# Patient Record
Sex: MALE | Race: WHITE | Employment: UNEMPLOYED | URBAN - METROPOLITAN AREA
[De-identification: names, ages, dates, MRNs, and addresses within clinical notes are randomized per-mention and may not be internally consistent; named-entity substitution may affect disease eponyms.]

---

## 2017-01-04 ENCOUNTER — OFFICE VISIT (OUTPATIENT)
Dept: FAMILY MEDICINE CLINIC | Facility: CLINIC | Age: 27
End: 2017-01-04

## 2017-01-04 ENCOUNTER — OFFICE VISIT (OUTPATIENT)
Dept: NEUROLOGY | Facility: CLINIC | Age: 27
End: 2017-01-04

## 2017-01-04 VITALS
BODY MASS INDEX: 33.37 KG/M2 | RESPIRATION RATE: 16 BRPM | DIASTOLIC BLOOD PRESSURE: 80 MMHG | HEIGHT: 74 IN | HEART RATE: 90 BPM | SYSTOLIC BLOOD PRESSURE: 136 MMHG | WEIGHT: 260 LBS

## 2017-01-04 DIAGNOSIS — R29.818 TRANSIENT NEUROLOGICAL SYMPTOMS: Primary | ICD-10-CM

## 2017-01-04 DIAGNOSIS — F41.9 ANXIETY: ICD-10-CM

## 2017-01-04 DIAGNOSIS — R20.2 PARESTHESIAS: Primary | ICD-10-CM

## 2017-01-04 PROCEDURE — 99214 OFFICE O/P EST MOD 30 MIN: CPT | Performed by: FAMILY MEDICINE

## 2017-01-04 RX ORDER — DULOXETIN HYDROCHLORIDE 30 MG/1
30 CAPSULE, DELAYED RELEASE ORAL DAILY
Qty: 30 CAPSULE | Refills: 2 | Status: SHIPPED | OUTPATIENT
Start: 2017-01-04 | End: 2017-01-16

## 2017-01-04 RX ORDER — GABAPENTIN 300 MG/1
300 CAPSULE ORAL 3 TIMES DAILY
Qty: 90 CAPSULE | Refills: 1 | Status: SHIPPED | OUTPATIENT
Start: 2017-01-04 | End: 2017-01-16 | Stop reason: ALTCHOICE

## 2017-01-04 NOTE — PROGRESS NOTES
Yoon Leavitt is a 32year old male here for Patient presents with: Follow - Up: The patient would liike to discuss Venlafaxine 75 Mg.  The patient states that he has been experiencing loss of appetite with the mediaction, however he is craving a lot of s anxiety  -uncontrolled  -increase gabapentin to 300mg bid for 5 days, then 300mg tid  -stop effexor  -start cymbalta  -risks and side effects of med discussed, patient expressed understanding  -these meds should hopefully act to reduce nerve pain and anxie

## 2017-01-04 NOTE — PATIENT INSTRUCTIONS
-- start gabapentin 300mg 2x/day, then increase to 3x/day as tolerated  -- start cymbalta as prescribed  -- if not covered, will try to get approved  -- if needed, can start venlafaxine again for time being

## 2017-01-05 ENCOUNTER — TELEPHONE (OUTPATIENT)
Dept: FAMILY MEDICINE CLINIC | Facility: CLINIC | Age: 27
End: 2017-01-05

## 2017-01-05 NOTE — PROGRESS NOTES
EMG machine malfunction; no charge; opened in error     Gregorio Elliott MD, Neurology  [facility]  Pager [phone]  1/4/2017

## 2017-01-10 ENCOUNTER — OFFICE VISIT (OUTPATIENT)
Dept: NEUROLOGY | Facility: CLINIC | Age: 27
End: 2017-01-10

## 2017-01-10 ENCOUNTER — TELEPHONE (OUTPATIENT)
Dept: FAMILY MEDICINE CLINIC | Facility: CLINIC | Age: 27
End: 2017-01-10

## 2017-01-10 DIAGNOSIS — M62.838 MUSCLE SPASM: ICD-10-CM

## 2017-01-10 DIAGNOSIS — R20.2 PARESTHESIAS: Primary | ICD-10-CM

## 2017-01-10 PROCEDURE — 95910 NRV CNDJ TEST 7-8 STUDIES: CPT | Performed by: OTHER

## 2017-01-10 PROCEDURE — 95886 MUSC TEST DONE W/N TEST COMP: CPT | Performed by: OTHER

## 2017-01-10 NOTE — PROCEDURES
[facility]  [address] [place], [address] [facility]  Ph: [phone]  FAX: [phone]        Full Name: Ila Velazquez Gender: Male  Patient ID: [id number] YOB: 1990      Visit Date: 1/10/2017 11:08  Age: Elbow APB 7.97 6.6 108 6.67 23.1 Elbow - Wrist 26 4.48 58   R Ulnar - ADM      Wrist ADM 2.40 12.8 100 5.63 38.8 Wrist - ADM 7        B. Elbow ADM 6.35 11.5 90.1 5.89 35.5 B. Elbow - Wrist 26 3.96 66   R Peroneal - EDB      Ankle EDB 4.64 4.3 100 5.89 14. This is a normal study. There is no electrophysiologic evidence to suggest an underlying large fiber neuropathy, myopathy, or primary demyelinating neuropathy, as clinically questioned.     ------------------------------  Gen Dolan MD, Neurology  [facility]

## 2017-01-10 NOTE — TELEPHONE ENCOUNTER
Pt called and said due to his behavior while being on Gabapentin he has been fired from his job. He would like to speak to Dr. Lianna Russo regarding some kind of disability.

## 2017-01-11 NOTE — TELEPHONE ENCOUNTER
Please let him know that he can check with his work whether he qualifies for short term disability. I would also be happy to write a note stating that he is currently being managed if that would help him get his job back once better.     Given his hard

## 2017-01-13 NOTE — TELEPHONE ENCOUNTER
Left msg for pt to follow up on previous msg left. If pt still needs assistance or has any further questions to contact our office. Did he get the chance to find out about the STD? Did he want the referral in for a navigator?

## 2017-01-16 NOTE — PROGRESS NOTES
Dulce Harris is a 32year old male here for Patient presents with:  Medication Follow-Up: The patient states that the Duloxetine never worked for him   Pain: The patient states that the pain is getting worse       HPI:       Pain/Anxiety/Depression  -sta past, tearful     ASSESSMENT/PLAN:     Depression/Anxiety/Pain  -suspect etiology of all symptoms to be likely be psychosomatic  -most neurological tests have been normal - EEG with very minimal abnormality that is unlikely to be seizure related - however

## 2017-01-19 ENCOUNTER — TELEPHONE (OUTPATIENT)
Dept: FAMILY MEDICINE CLINIC | Facility: CLINIC | Age: 27
End: 2017-01-19

## 2017-01-19 NOTE — TELEPHONE ENCOUNTER
Pt called and said he would like to speak to Dr. Moise Faye regarding his treatment plan. I told him he will not be getting a call back today b/c Dr. Moise Faye is gone for the day.

## 2017-01-24 ENCOUNTER — TELEPHONE (OUTPATIENT)
Dept: FAMILY MEDICINE CLINIC | Facility: CLINIC | Age: 27
End: 2017-01-24

## 2017-01-24 NOTE — TELEPHONE ENCOUNTER
Called Gomez Neri back on 1/19/17  -he is doing better  -denies suicidal ideation  -does better as long as he stays home and doesn't go out much  -this is what he has been doing to keep things controlled  -doesn't think he needs to go to ER at this point  -a

## 2017-01-24 NOTE — TELEPHONE ENCOUNTER
Pt called and said he would like to speak to Dr. Luis Le regarding how he is doing with the pain and the actions that are going to be taken.

## 2017-01-24 NOTE — TELEPHONE ENCOUNTER
Called patient back  -pain, anxiety not improving  -interested in getting evaluation for potential inpatient admission  -called several outpatient psychiatrists and none take his insurance  -he will go to [facility] ER for psychiatric evaluation - in [place]

## 2017-01-25 ENCOUNTER — TELEPHONE (OUTPATIENT)
Dept: FAMILY MEDICINE CLINIC | Facility: CLINIC | Age: 27
End: 2017-01-25

## 2017-01-25 NOTE — TELEPHONE ENCOUNTER
Spoke to patient and explained one option might be to see when he is eligible to enroll in the [address] as THE [facility] is now also taking that form of Medicaid but to check out their website first to see if possibly psych care is covere

## 2017-01-25 NOTE — TELEPHONE ENCOUNTER
Pt called [facility] and they don't have anybody for Elsa Estrada to see. Every number we gave them they don't take [facility].

## 2017-01-25 NOTE — TELEPHONE ENCOUNTER
Spoke to patient and he said that the ER visit resulted in nothing. They did not have an inpatient psych treatment but wanted him to take or go thru outpatient but the center they gave him does not take his insurance. He was not given any meds.   Explaine

## 2017-01-25 NOTE — TELEPHONE ENCOUNTER
Please tell him that I have sent a message to our coordinators for help with our options  -we should get some answers hopefully in a day or two

## 2017-01-27 ENCOUNTER — TELEPHONE (OUTPATIENT)
Dept: FAMILY MEDICINE CLINIC | Facility: CLINIC | Age: 27
End: 2017-01-27

## 2017-01-27 RX ORDER — LORAZEPAM 0.5 MG/1
0.5 TABLET ORAL 3 TIMES DAILY
Qty: 30 TABLET | Refills: 0 | Status: SHIPPED | OUTPATIENT
Start: 2017-01-27 | End: 2017-03-01

## 2017-01-27 NOTE — TELEPHONE ENCOUNTER
Dicussed case with Dr. Daily Perea  -he recommends trying ativan 0.5mg 3x/day (9a,1p,9p) - can decrease to bid if too sedating - but this is less sedating than clonazepam  -continue prozac 20mg for at least 4 wks  -could consider adding wellbutrin at that time

## 2017-01-30 ENCOUNTER — TELEPHONE (OUTPATIENT)
Dept: FAMILY MEDICINE CLINIC | Facility: CLINIC | Age: 27
End: 2017-01-30

## 2017-01-31 NOTE — TELEPHONE ENCOUNTER
Spoke to patient and he said that the minutes on his phone went out so the call was dropped with the information for the coordinator.   Gave him the number to [employer] to contact and asked pt to leave her a message and explain what happened

## 2017-02-07 ENCOUNTER — TELEPHONE (OUTPATIENT)
Dept: FAMILY MEDICINE CLINIC | Facility: CLINIC | Age: 27
End: 2017-02-07

## 2017-02-09 NOTE — TELEPHONE ENCOUNTER
Pt found a facility that takes [facility] psych patients, [facility]   He will need a referral

## 2017-02-27 ENCOUNTER — OFFICE VISIT (OUTPATIENT)
Dept: FAMILY MEDICINE CLINIC | Facility: CLINIC | Age: 27
End: 2017-02-27

## 2017-02-27 VITALS
HEIGHT: 74 IN | RESPIRATION RATE: 18 BRPM | SYSTOLIC BLOOD PRESSURE: 122 MMHG | HEART RATE: 100 BPM | DIASTOLIC BLOOD PRESSURE: 80 MMHG | BODY MASS INDEX: 34.78 KG/M2 | WEIGHT: 271 LBS

## 2017-02-27 DIAGNOSIS — R20.2 PARESTHESIAS: ICD-10-CM

## 2017-02-27 DIAGNOSIS — R29.818 TRANSIENT NEUROLOGICAL SYMPTOMS: Primary | ICD-10-CM

## 2017-02-27 PROCEDURE — 99213 OFFICE O/P EST LOW 20 MIN: CPT | Performed by: FAMILY MEDICINE

## 2017-02-27 NOTE — PATIENT INSTRUCTIONS
-- call Hines Lefort to get counseling options that take your insurance  -- agree to hold any medication for now  -- continue to maintain relationship with friend to have enjoyable conversations  -- call to schedule pending EEG test   -- try to start an exercise

## 2017-03-01 NOTE — PROGRESS NOTES
Chiquita Nunez is a 32year old male here for Patient presents with:   Follow - Up      HPI:     Nerve Pain  -improved significantly after having a good conversation with a friend about topics he enjoys  -has since stopped all meds - felt the fluoxetine mad

## 2017-03-06 ENCOUNTER — TELEPHONE (OUTPATIENT)
Dept: FAMILY MEDICINE CLINIC | Facility: CLINIC | Age: 27
End: 2017-03-06

## 2017-03-06 NOTE — TELEPHONE ENCOUNTER
Pt called to schedule his EEG (sleep deprived) at the [facility] and the test that was ordered is the test he's already had done in the past so he did not schedule it.

## 2017-03-07 NOTE — TELEPHONE ENCOUNTER
Spoke to patient and he said he went to order this test but that Central scheduling could not find it. Spoke to Radha/Central scheduling and she found it and she will schedule patient for this test.  Pt is to call her directly at [phone].   Number giv

## 2017-03-07 NOTE — TELEPHONE ENCOUNTER
Please have him call them back to clarify, as the test ordered looks like it is a 24 hour EEG which is different that his other test  -if they do not find it in the system, let us know  -thanks

## 2017-03-13 ENCOUNTER — NURSE ONLY (OUTPATIENT)
Dept: ELECTROPHYSIOLOGY | Facility: HOSPITAL | Age: 27
End: 2017-03-13
Attending: Other
Payer: MEDICAID

## 2017-03-13 DIAGNOSIS — R20.2 PARESTHESIA: ICD-10-CM

## 2017-03-13 DIAGNOSIS — R29.818 TRANSIENT NEUROLOGICAL SYMPTOMS: ICD-10-CM

## 2017-03-13 DIAGNOSIS — R94.01 ABNORMAL EEG: ICD-10-CM

## 2017-03-13 DIAGNOSIS — G24.5 EYE TWITCH: ICD-10-CM

## 2017-03-13 PROCEDURE — 95953 EEG MONITORING/COMPUTER: CPT | Performed by: OTHER

## 2017-03-14 ENCOUNTER — NURSE ONLY (OUTPATIENT)
Dept: ELECTROPHYSIOLOGY | Facility: HOSPITAL | Age: 27
End: 2017-03-14
Attending: Other
Payer: MEDICAID

## 2017-03-17 ENCOUNTER — TELEPHONE (OUTPATIENT)
Dept: FAMILY MEDICINE CLINIC | Facility: CLINIC | Age: 27
End: 2017-03-17

## 2017-03-24 ENCOUNTER — TELEPHONE (OUTPATIENT)
Dept: FAMILY MEDICINE CLINIC | Facility: CLINIC | Age: 27
End: 2017-03-24

## 2017-03-27 ENCOUNTER — TELEPHONE (OUTPATIENT)
Dept: FAMILY MEDICINE CLINIC | Facility: CLINIC | Age: 27
End: 2017-03-27

## 2017-03-27 ENCOUNTER — TELEPHONE (OUTPATIENT)
Dept: NEUROLOGY | Facility: CLINIC | Age: 27
End: 2017-03-27

## 2017-03-27 NOTE — TELEPHONE ENCOUNTER
Patient had an initial consult for counseling and they are supposed to get back with him regarding appts.   Patient will f/u with Dr Juliana Pereira as needed

## 2017-03-27 NOTE — TELEPHONE ENCOUNTER
Because the EEG was normal, I think counseling will continue to help minimize his symptoms  -has he started this yet?  -if he has, and is still having symptoms, have him followup with me  -thanks

## 2017-03-31 ENCOUNTER — TELEPHONE (OUTPATIENT)
Dept: FAMILY MEDICINE CLINIC | Facility: CLINIC | Age: 27
End: 2017-03-31

## 2017-03-31 RX ORDER — CITALOPRAM 20 MG/1
20 TABLET ORAL DAILY
Qty: 30 TABLET | Refills: 2 | Status: SHIPPED | OUTPATIENT
Start: 2017-03-31 | End: 2017-05-01

## 2017-03-31 NOTE — TELEPHONE ENCOUNTER
Pains are getting worse  -no longer taking any meds  -has appt with counselor next week  -wants to try something else  -sent in celexa   -counseled on side effects  -f/u in 2-4 wks depending on results

## 2017-05-01 ENCOUNTER — TELEPHONE (OUTPATIENT)
Dept: FAMILY MEDICINE CLINIC | Facility: CLINIC | Age: 27
End: 2017-05-01

## 2017-05-01 RX ORDER — CITALOPRAM 20 MG/1
20 TABLET ORAL DAILY
Qty: 30 TABLET | Refills: 0 | Status: SHIPPED | OUTPATIENT
Start: 2017-05-01 | End: 2017-05-08

## 2017-05-01 NOTE — TELEPHONE ENCOUNTER
rx for citalopram has been sent to pharmacy in [facility]    Dr Phelps,patient is requesting a call from you

## 2017-05-01 NOTE — TELEPHONE ENCOUNTER
Attempted to call patient x 3 - only busy signal  -if patient calls back, please verify his call back number  -thanks

## 2017-05-01 NOTE — TELEPHONE ENCOUNTER
Pt would like Dr. Jorge Frank to call him when he gets a moment regarding his treatment plan. Pt would like to be called back at [phone]. Pt also needs a refill on Citalopram Hydrobromide 20 MG sent to the [facility] in [place], [place].   Pt also had me turpin

## 2017-05-08 ENCOUNTER — OFFICE VISIT (OUTPATIENT)
Dept: FAMILY MEDICINE CLINIC | Facility: CLINIC | Age: 27
End: 2017-05-08

## 2017-05-08 VITALS
DIASTOLIC BLOOD PRESSURE: 88 MMHG | HEART RATE: 69 BPM | SYSTOLIC BLOOD PRESSURE: 128 MMHG | RESPIRATION RATE: 18 BRPM | HEIGHT: 75 IN | TEMPERATURE: 98 F | WEIGHT: 268 LBS | OXYGEN SATURATION: 97 % | BODY MASS INDEX: 33.32 KG/M2

## 2017-05-08 DIAGNOSIS — R20.2 PARESTHESIAS: Primary | ICD-10-CM

## 2017-05-08 DIAGNOSIS — R94.01 ABNORMAL EEG: ICD-10-CM

## 2017-05-08 DIAGNOSIS — M62.838 MUSCLE SPASM: ICD-10-CM

## 2017-05-08 PROCEDURE — 99214 OFFICE O/P EST MOD 30 MIN: CPT | Performed by: FAMILY MEDICINE

## 2017-05-08 RX ORDER — CITALOPRAM 20 MG/1
20 TABLET ORAL DAILY
Qty: 30 TABLET | Refills: 2 | Status: SHIPPED | OUTPATIENT
Start: 2017-05-31 | End: 2017-05-31

## 2017-05-08 NOTE — PROGRESS NOTES
Jacey Coulter is a 32year old male here for Patient presents with:  Paresthesias: F/U and discuss treatment on nerve pain and muscle spasms pt is unable to work       HPI:     Nerve Pain/Paresthesias/Muscle Spasm  -continues to have episodes of pain that Spasms  -referred to [facility] neurology - patient will confirm they take his insurance  -printed test results to bring to appt    Depression  -doing better  -c/w celexa  -c/w counseling  -if symptoms severely worsening go to ER (have not been able to find a ps

## 2017-05-08 NOTE — PATIENT INSTRUCTIONS
-- call [facility] neurology dept to schedule an appt and make sure they take [facility] insurance   -- bring records in during appt  -- continue celexa for now  -- call with any issues

## 2017-05-09 ENCOUNTER — TELEPHONE (OUTPATIENT)
Dept: FAMILY MEDICINE CLINIC | Facility: CLINIC | Age: 27
End: 2017-05-09

## 2017-05-09 NOTE — TELEPHONE ENCOUNTER
I would have him call his insurance to get names of neurologists in his network  - he could at least start by seeing someone in his network for a second opinion

## 2017-05-09 NOTE — TELEPHONE ENCOUNTER
Pt called and said that he talked to the [address] [facility] and they do take [facility] but they only take the Integrated program and not the [facility].

## 2017-05-10 NOTE — TELEPHONE ENCOUNTER
LM for Pricilla Aguilar to contact his insurance to find a neurologist in his network to get a second opinion since the [facility] does not accept his insurance-per Dr Choco Samuel

## 2017-05-10 NOTE — TELEPHONE ENCOUNTER
Merritt Babin -friend of Luis De Guzman said she received a message on her cell for Lan Arora. They are not together. She said it's ok she is his emergency contact for now but call his cell.  [phone]  (changing home number/lk)

## 2017-05-31 RX ORDER — CITALOPRAM 20 MG/1
20 TABLET ORAL DAILY
Qty: 30 TABLET | Refills: 1 | Status: SHIPPED | OUTPATIENT
Start: 2017-05-31 | End: 2017-07-10 | Stop reason: ALTCHOICE

## 2017-05-31 NOTE — TELEPHONE ENCOUNTER
From: Chiquita Nunez  To:  Hay Capellan MD  Sent: 5/31/2017 8:58 AM CDT  Subject: Medication Renewal Request    Original authorizing provider: MD Chiquita Shafer would like a refill of the following medications:  Citalopram Hydrobromid

## 2017-06-07 ENCOUNTER — TELEPHONE (OUTPATIENT)
Dept: FAMILY MEDICINE CLINIC | Facility: CLINIC | Age: 27
End: 2017-06-07

## 2017-06-07 NOTE — TELEPHONE ENCOUNTER
Patient wants to know if this is a diagnosis that can be used to secure social security income  Per Dr Heidi Shaw diagnosis is too vague.  Patient either needs to see Dr Iwona Albert again or get a second opinion from another neurologist. He will also need a

## 2017-06-07 NOTE — TELEPHONE ENCOUNTER
That is exactly what he has - however, all it really means is a nerve problem (neuropathy) which we don't know the cause of (idiopathic)

## 2017-06-07 NOTE — TELEPHONE ENCOUNTER
Pt has been doing some research. He wants to know what Dr. Drake Fong thinks about using diagnosis: Idiopathic Neuropathy?

## 2017-06-26 ENCOUNTER — TELEPHONE (OUTPATIENT)
Dept: FAMILY MEDICINE CLINIC | Facility: CLINIC | Age: 27
End: 2017-06-26

## 2017-06-26 NOTE — TELEPHONE ENCOUNTER
Cymbalta can cause muscle weakness, so this could be an effect of that, and could last up to a couple of weeks    Please clarify if patient was able to see another specialist and who prescribed the cymbalta

## 2017-06-27 NOTE — TELEPHONE ENCOUNTER
Patient was recently hospitalized and Dr Tova Haro put him on Cymbalta 60 mg. It was discontinued by Dr Tova Haro because of the muscle weakness and fatigue.  Dr Tova Haro also discontinued the citalopram. Patient states that he will be seeing Dr Tova Haro again as part of

## 2017-07-10 ENCOUNTER — OFFICE VISIT (OUTPATIENT)
Dept: FAMILY MEDICINE CLINIC | Facility: CLINIC | Age: 27
End: 2017-07-10

## 2017-07-10 ENCOUNTER — TELEPHONE (OUTPATIENT)
Dept: FAMILY MEDICINE CLINIC | Facility: CLINIC | Age: 27
End: 2017-07-10

## 2017-07-10 VITALS
HEART RATE: 82 BPM | WEIGHT: 265 LBS | HEIGHT: 75 IN | SYSTOLIC BLOOD PRESSURE: 138 MMHG | BODY MASS INDEX: 32.95 KG/M2 | DIASTOLIC BLOOD PRESSURE: 70 MMHG

## 2017-07-10 DIAGNOSIS — Z01.818 PREOP EXAMINATION: Primary | ICD-10-CM

## 2017-07-10 DIAGNOSIS — N35.119 POSTINFECTIVE URETHRAL STRICTURE IN MALE: ICD-10-CM

## 2017-07-10 PROCEDURE — 99243 OFF/OP CNSLTJ NEW/EST LOW 30: CPT | Performed by: FAMILY MEDICINE

## 2017-07-10 PROCEDURE — 93000 ELECTROCARDIOGRAM COMPLETE: CPT | Performed by: FAMILY MEDICINE

## 2017-07-10 NOTE — PROGRESS NOTES
PREOPERATIVE HISTORY AND PHYSICAL     Pre-op clearance requested by:  Dr. Galen Nogueira  Proposed surgery: cystoscopy and uretheral dilation  Date of Procedure: 7/21/17  Location: Hospital[facility] (Indications. symptoms): Octavia Alan is a 32year old m Size: large)   Pulse 82   Ht 75\"   Wt 265 lb   BMI 33.12 kg/m²     Gen: NAD, alert and oriented x 3  HEENT: NCAT, PERRL, no exudates  Neck: supple, no LAD, no masses  Pulm: CTAB, no wheezing  CV: RRR, no murmurs  Abd: soft, ND, NT, +BS  Ext: full ROM, nor

## 2017-08-23 DIAGNOSIS — G72.2 TOXIC MYOPATHY: ICD-10-CM

## 2017-08-23 DIAGNOSIS — G93.9 DISEASE OF BRAIN: Primary | ICD-10-CM

## 2017-08-23 NOTE — PROGRESS NOTES
Orders received from [facility] neurology for pt to have labs and MRI done. Per pt he already had the labs done he just needs the MRI. Orders placed in system and will wait for approval from the referral dept.

## 2017-08-31 DIAGNOSIS — R29.818 TRANSIENT NEUROLOGICAL SYMPTOMS: Primary | ICD-10-CM

## 2017-08-31 DIAGNOSIS — R20.2 PARESTHESIAS: ICD-10-CM

## 2017-08-31 DIAGNOSIS — R94.01 ABNORMAL EEG: ICD-10-CM

## 2017-09-20 ENCOUNTER — TELEPHONE (OUTPATIENT)
Dept: FAMILY MEDICINE CLINIC | Facility: CLINIC | Age: 27
End: 2017-09-20

## 2017-09-20 NOTE — TELEPHONE ENCOUNTER
Pt is looking for results from MRI that was done Monday at  [facility]. And also, if we did get them did we we sent them to his Neurologist. Elenita Reyes in [place]?

## 2017-09-20 NOTE — TELEPHONE ENCOUNTER
Spoke to patient and explained that no MRI results have come thru here. Has he called Dr. Roberson Figures office? Pt states he will call them again tomorrow as they are currently closed.

## 2017-09-22 ENCOUNTER — PATIENT MESSAGE (OUTPATIENT)
Dept: FAMILY MEDICINE CLINIC | Facility: CLINIC | Age: 27
End: 2017-09-22

## 2017-09-22 NOTE — TELEPHONE ENCOUNTER
From: Iveth Benitez  To: Ronald Thompson MD  Sent: 9/22/2017 2:15 PM CDT  Subject: Test Results Question    Will Dr Robinson Harmon be reviewing the MRI results or just passing them off to Dr River Resendez?

## 2018-01-25 ENCOUNTER — TELEPHONE (OUTPATIENT)
Dept: FAMILY MEDICINE CLINIC | Facility: CLINIC | Age: 28
End: 2018-01-25

## 2018-01-25 DIAGNOSIS — R94.01 ABNORMAL EEG: ICD-10-CM

## 2018-01-25 DIAGNOSIS — R20.2 PARESTHESIAS: Primary | ICD-10-CM

## 2018-01-25 NOTE — TELEPHONE ENCOUNTER
.Reason for the order/referral: OV   PCP:  Dr Nicola Butt   Refer to Provider (first and last name):Dr Yareli Jeter   Specialty: Neuro   Patient Insurance: Payor: MEDICAID REPLACEMENT / Plan: [address] PLANSDuration/Summary of Symptoms:

## 2018-01-26 RX ORDER — CLOBAZAM 10 MG/1
TABLET ORAL
Refills: 5 | COMMUNITY
Start: 2017-12-30 | End: 2018-03-12

## 2018-01-26 RX ORDER — CLONAZEPAM 0.5 MG/1
TABLET ORAL
Refills: 3 | COMMUNITY
Start: 2018-01-10 | End: 2018-03-12 | Stop reason: ALTCHOICE

## 2018-03-08 ENCOUNTER — TELEPHONE (OUTPATIENT)
Dept: FAMILY MEDICINE CLINIC | Facility: CLINIC | Age: 28
End: 2018-03-08

## 2018-03-08 NOTE — TELEPHONE ENCOUNTER
Pt called and said he needs a referral for an MRI of his neck. He had a referral to see Dr. Junior Melissa and he wants him to have the MRI.

## 2018-03-09 NOTE — TELEPHONE ENCOUNTER
Patient needs to be seen prior to ordering the MRI  -I need to get the history and background of what's going on or insurance won't cover it  -he can see me next week  -thanks

## 2018-03-12 ENCOUNTER — OFFICE VISIT (OUTPATIENT)
Dept: FAMILY MEDICINE CLINIC | Facility: CLINIC | Age: 28
End: 2018-03-12

## 2018-03-12 VITALS
DIASTOLIC BLOOD PRESSURE: 72 MMHG | SYSTOLIC BLOOD PRESSURE: 128 MMHG | WEIGHT: 287 LBS | BODY MASS INDEX: 36.83 KG/M2 | HEIGHT: 74 IN | HEART RATE: 88 BPM

## 2018-03-12 DIAGNOSIS — F39 MOOD DISORDER (HCC): ICD-10-CM

## 2018-03-12 DIAGNOSIS — R52 SHOOTING PAIN: Primary | ICD-10-CM

## 2018-03-12 DIAGNOSIS — R29.818 TRANSIENT NEUROLOGICAL SYMPTOMS: ICD-10-CM

## 2018-03-12 DIAGNOSIS — R20.2 ARM PARESTHESIA, LEFT: ICD-10-CM

## 2018-03-12 DIAGNOSIS — R20.2 ARM PARESTHESIA, RIGHT: ICD-10-CM

## 2018-03-12 PROCEDURE — 99215 OFFICE O/P EST HI 40 MIN: CPT | Performed by: FAMILY MEDICINE

## 2018-03-12 NOTE — PATIENT INSTRUCTIONS
-- call next week if you haven't heard of anything about MRI approval  -- followup with neurosurgeon after MRI  -- call us if need anything in meantime

## 2018-03-13 NOTE — PROGRESS NOTES
Abhishek Hope is a 29year old male here for Patient presents with: Follow - Up: MRI       HPI:     1. Shooting pain  2. Transient neurological symptoms  3. Arm paresthesia, left  4.  Arm paresthesia, right  -continues to have significant shooting pains t pain  2. Transient neurological symptoms  3. Arm paresthesia, left  4.  Arm paresthesia, right  -ordered MRI spine given persistence of symptoms  -followup with neurosurgery and neurology as planned    Mood disturbances  -likely contributing to some of his

## 2018-03-16 ENCOUNTER — TELEPHONE (OUTPATIENT)
Dept: FAMILY MEDICINE CLINIC | Facility: CLINIC | Age: 28
End: 2018-03-16

## 2018-03-16 NOTE — TELEPHONE ENCOUNTER
Received a call yesterday from [address] [place] [address] and she said that when attempting to get the MRI approved it was already approved on 3/8/18 to be done at [facility]. Dr. Clint Berry was the ordering physician.     Spoke to patient this AM and he said t

## 2018-09-24 ENCOUNTER — TELEPHONE (OUTPATIENT)
Dept: FAMILY MEDICINE CLINIC | Facility: CLINIC | Age: 28
End: 2018-09-24

## 2022-07-27 NOTE — PATIENT INSTRUCTIONS
-- start prozac (fluoxetine) as prescribed  -- start 1/2 tab of clonazepam 2x/day for anxiety - if not helping too much max dose is one tab 2x/day  -- touch base with me in 2-3 days to see how you're doing  -- if not better or worsening, we will plan for i
yes

## 2022-12-15 NOTE — TELEPHONE ENCOUNTER
Dr Deidre Cordova advise regarding patient d/c-ing gabapentin
Patient states that he will try the cymbalta
Pt called and said he was here to see Dr. Bruce Zelaya yesterday and told him about what was happening with his medication.   He looked up side effects for Gabapentin and he thinks the irritability, over reactions, shaking and moods swings is from the Gabapentin s
i think it is fine if he wants to try a period off of gabapentin to see if that helps, if so, he should try to go back on effexor to see if that starts helping again  -if not, he can try the cymbalta instead
Other

## 2024-05-07 NOTE — PROGRESS NOTES
Pt saw a neurologist Dr. Venus Hall at [place] [address]. [phone] and they Solitario Delgado ordered an MRI of brain. This was denied by pt's insurance.   Peer to peer was requested but the neurology office changed the orders now to a [address]
31 or more

## (undated) NOTE — MR AVS SNAPSHOT
[facility]  [address], [address] [facility] [address] [phone]               Thank you for choosing us for your health care visit with Aaron Brito MD.  We are glad to serve you and happy to provide you with this crenshaw [email or web address]. If you've recently had a stay at the Hospital you can access your discharge instructions in [employer] by going to Visits < Admission Summaries.  If you've been to the Emergency Department or your doctor's office, you can view yo

## (undated) NOTE — MR AVS SNAPSHOT
[address] [facility] [address] [phone]               Thank you for choosing us for your health care visit with Millie Gallegos MD.  We are glad to serve you and happy to provide you with this summary of your visi

## (undated) NOTE — MR AVS SNAPSHOT
[facility]  [phone]               Thank you for choosing us for your health care visit with Willie Maza MD.  We are glad to serve you and happy to provide you with this crenshaw These medications were sent to [address] [facility], [address] [place] [address]. [place] [phone], [phone]  [address] [place] [address]     Phone:  [phone]    - DULoxetine HCl 30 MG Cpep  - gabapentin 300 MG Caps            Referral Information

## (undated) NOTE — MR AVS SNAPSHOT
[facility]  [phone]               Thank you for choosing us for your health care visit with Nova Matute MD.  We are glad to serve you and happy to provide you with this crenshaw authorization numbers or be assured that none are required. You can then schedule your appointment. Failure to obtain required authorization numbers can create reimbursement difficulties for you.         Patient with sharp, shooting pains that occur for sec Summaries. If you've been to the Emergency Department or your doctor's office, you can view your past visit information in [employer] by going to Visits < Visit Summaries. Wooop questions? Call [phone] for help.   [employer] is NOT to be used for urge

## (undated) NOTE — MR AVS SNAPSHOT
[facility]  [phone]               Thank you for choosing us for your health care visit with Chauncey Alford MD.  We are glad to serve you and happy to provide you with this crenshaw - ClonazePAM 0.5 MG Tabs            MyChart     Visit MyChart  You can access your MyChart to more actively manage your health care and view more details from this visit by going to [email or web address].   If you've recently had a stay at the [facility]

## (undated) NOTE — MR AVS SNAPSHOT
[facility]  [phone]               Thank you for choosing us for your health care visit with Sonali Prabhakar MD.  We are glad to serve you and happy to provide you with this crenshaw For medical emergencies, dial 911.            Visit [facility] online at  [email or web address]